# Patient Record
Sex: FEMALE | Race: WHITE
[De-identification: names, ages, dates, MRNs, and addresses within clinical notes are randomized per-mention and may not be internally consistent; named-entity substitution may affect disease eponyms.]

---

## 2018-05-22 ENCOUNTER — HOSPITAL ENCOUNTER (OUTPATIENT)
Dept: HOSPITAL 95 - LAB SHORT | Age: 73
End: 2018-05-22
Attending: DERMATOLOGY
Payer: MEDICARE

## 2018-05-22 DIAGNOSIS — D22.71: Primary | ICD-10-CM

## 2019-08-20 NOTE — NUR
08/20/19 Vero Barnard
ALL INSTRUCTIONS WERE GIVEN AND DEMOS WITH RETURN DEMOS COPIES IN
FOLDER. ALYSSA PK ON . I.S. WITH DEMO. PT AMBULATED WITH ASSIST TO BR
AND VOIDED QS YELLOW URINE. BACK TO ROOM WITHOUT INCIDENT

## 2019-08-20 NOTE — NUR
08/20/19 0905 Scar Chavez
INTRTASCALING BLOCK COMPLETED IN OR WITHOUT DIFFICULTY. PT NOTED TO
HAVE A SMAL HEALING ABRASION ON DORSAL ASPECT OF LEFT WRIST. NO OPEN
SKIN NOTED.